# Patient Record
Sex: FEMALE | URBAN - METROPOLITAN AREA
[De-identification: names, ages, dates, MRNs, and addresses within clinical notes are randomized per-mention and may not be internally consistent; named-entity substitution may affect disease eponyms.]

---

## 2024-04-19 ENCOUNTER — TELEPHONE (OUTPATIENT)
Dept: PRIMARY CARE CLINIC | Age: 24
End: 2024-04-19

## 2024-04-19 NOTE — TELEPHONE ENCOUNTER
Called pt and let her know provider is out until July. Gave her  the option of waiting until she returns in July or est care at one of the sister facility's [facility] or [facility]

## 2024-04-19 NOTE — TELEPHONE ENCOUNTER
----- Message from Víctor Bob America Arrieta sent at 4/19/2024 12:50 PM EDT -----  Regarding: [facility] Appointment Request  [facility] Appointment Request    Patient needs appointment for [facility] Appointment Type: New to Provider.    Reason for Appointment Request: No appointments available during search. The patient is requesting to set an appointment to be an Established Patient with PCP Esther Watkins  Preferred date and time: Week of May 6 morning or afternoon    --------------------------------------------------------------------------------------------------------------------------    Relationship to Patient: Self     Call Back Information: OK to leave message on voicemail  Preferred Call Back Number: Phone [phone]